# Patient Record
Sex: FEMALE | Race: OTHER | NOT HISPANIC OR LATINO | URBAN - METROPOLITAN AREA
[De-identification: names, ages, dates, MRNs, and addresses within clinical notes are randomized per-mention and may not be internally consistent; named-entity substitution may affect disease eponyms.]

---

## 2017-08-16 ENCOUNTER — OUTPATIENT (OUTPATIENT)
Dept: OUTPATIENT SERVICES | Facility: HOSPITAL | Age: 37
LOS: 1 days | End: 2017-08-16
Payer: MEDICAID

## 2017-08-17 PROCEDURE — 78014 THYROID IMAGING W/BLOOD FLOW: CPT | Mod: 26

## 2017-08-17 PROCEDURE — 78014 THYROID IMAGING W/BLOOD FLOW: CPT

## 2017-08-17 PROCEDURE — A9516: CPT

## 2017-08-29 ENCOUNTER — OUTPATIENT (OUTPATIENT)
Dept: OUTPATIENT SERVICES | Facility: HOSPITAL | Age: 37
LOS: 1 days | End: 2017-08-29
Payer: MEDICAID

## 2017-08-29 PROCEDURE — 84702 CHORIONIC GONADOTROPIN TEST: CPT

## 2017-08-29 PROCEDURE — A9517: CPT

## 2017-08-29 PROCEDURE — 79005 NUCLEAR RX ORAL ADMIN: CPT

## 2017-08-29 PROCEDURE — 79005 NUCLEAR RX ORAL ADMIN: CPT | Mod: 26

## 2022-10-08 ENCOUNTER — EMERGENCY (EMERGENCY)
Facility: HOSPITAL | Age: 42
LOS: 1 days | Discharge: HOME | End: 2022-10-08
Attending: STUDENT IN AN ORGANIZED HEALTH CARE EDUCATION/TRAINING PROGRAM | Admitting: STUDENT IN AN ORGANIZED HEALTH CARE EDUCATION/TRAINING PROGRAM
Payer: COMMERCIAL

## 2022-10-08 VITALS
TEMPERATURE: 98 F | SYSTOLIC BLOOD PRESSURE: 118 MMHG | DIASTOLIC BLOOD PRESSURE: 74 MMHG | OXYGEN SATURATION: 100 % | HEIGHT: 65 IN | RESPIRATION RATE: 18 BRPM | HEART RATE: 75 BPM

## 2022-10-08 VITALS
HEART RATE: 74 BPM | SYSTOLIC BLOOD PRESSURE: 132 MMHG | RESPIRATION RATE: 18 BRPM | DIASTOLIC BLOOD PRESSURE: 76 MMHG | TEMPERATURE: 99 F | OXYGEN SATURATION: 98 %

## 2022-10-08 DIAGNOSIS — O26.851 SPOTTING COMPLICATING PREGNANCY, FIRST TRIMESTER: ICD-10-CM

## 2022-10-08 DIAGNOSIS — Z98.818 OTHER DENTAL PROCEDURE STATUS: ICD-10-CM

## 2022-10-08 DIAGNOSIS — D25.1 INTRAMURAL LEIOMYOMA OF UTERUS: ICD-10-CM

## 2022-10-08 DIAGNOSIS — O99.281 ENDOCRINE, NUTRITIONAL AND METABOLIC DISEASES COMPLICATING PREGNANCY, FIRST TRIMESTER: ICD-10-CM

## 2022-10-08 DIAGNOSIS — Z20.822 CONTACT WITH AND (SUSPECTED) EXPOSURE TO COVID-19: ICD-10-CM

## 2022-10-08 DIAGNOSIS — E03.9 HYPOTHYROIDISM, UNSPECIFIED: ICD-10-CM

## 2022-10-08 DIAGNOSIS — E05.00 THYROTOXICOSIS WITH DIFFUSE GOITER WITHOUT THYROTOXIC CRISIS OR STORM: ICD-10-CM

## 2022-10-08 DIAGNOSIS — O20.9 HEMORRHAGE IN EARLY PREGNANCY, UNSPECIFIED: ICD-10-CM

## 2022-10-08 DIAGNOSIS — Z3A.01 LESS THAN 8 WEEKS GESTATION OF PREGNANCY: ICD-10-CM

## 2022-10-08 DIAGNOSIS — R10.30 LOWER ABDOMINAL PAIN, UNSPECIFIED: ICD-10-CM

## 2022-10-08 DIAGNOSIS — O34.11 MATERNAL CARE FOR BENIGN TUMOR OF CORPUS UTERI, FIRST TRIMESTER: ICD-10-CM

## 2022-10-08 DIAGNOSIS — O99.891 OTHER SPECIFIED DISEASES AND CONDITIONS COMPLICATING PREGNANCY: ICD-10-CM

## 2022-10-08 LAB
ANION GAP SERPL CALC-SCNC: 7 MMOL/L — SIGNIFICANT CHANGE UP (ref 5–17)
ANION GAP SERPL CALC-SCNC: 9 MMOL/L — SIGNIFICANT CHANGE UP (ref 5–17)
APPEARANCE UR: CLEAR — SIGNIFICANT CHANGE UP
BASOPHILS # BLD AUTO: 0.02 K/UL — SIGNIFICANT CHANGE UP (ref 0–0.2)
BASOPHILS NFR BLD AUTO: 0.3 % — SIGNIFICANT CHANGE UP (ref 0–2)
BILIRUB UR-MCNC: NEGATIVE — SIGNIFICANT CHANGE UP
BLD GP AB SCN SERPL QL: NEGATIVE — SIGNIFICANT CHANGE UP
BUN SERPL-MCNC: 9 MG/DL — SIGNIFICANT CHANGE UP (ref 7–23)
BUN SERPL-MCNC: 9 MG/DL — SIGNIFICANT CHANGE UP (ref 7–23)
CALCIUM SERPL-MCNC: 9.3 MG/DL — SIGNIFICANT CHANGE UP (ref 8.4–10.5)
CALCIUM SERPL-MCNC: 9.4 MG/DL — SIGNIFICANT CHANGE UP (ref 8.4–10.5)
CHLORIDE SERPL-SCNC: 104 MMOL/L — SIGNIFICANT CHANGE UP (ref 96–108)
CHLORIDE SERPL-SCNC: 104 MMOL/L — SIGNIFICANT CHANGE UP (ref 96–108)
CO2 SERPL-SCNC: 25 MMOL/L — SIGNIFICANT CHANGE UP (ref 22–31)
CO2 SERPL-SCNC: 26 MMOL/L — SIGNIFICANT CHANGE UP (ref 22–31)
COLOR SPEC: YELLOW — SIGNIFICANT CHANGE UP
CREAT SERPL-MCNC: 0.81 MG/DL — SIGNIFICANT CHANGE UP (ref 0.5–1.3)
CREAT SERPL-MCNC: 0.85 MG/DL — SIGNIFICANT CHANGE UP (ref 0.5–1.3)
DIFF PNL FLD: ABNORMAL
EGFR: 87 ML/MIN/1.73M2 — SIGNIFICANT CHANGE UP
EGFR: 92 ML/MIN/1.73M2 — SIGNIFICANT CHANGE UP
EOSINOPHIL # BLD AUTO: 0.04 K/UL — SIGNIFICANT CHANGE UP (ref 0–0.5)
EOSINOPHIL NFR BLD AUTO: 0.6 % — SIGNIFICANT CHANGE UP (ref 0–6)
GLUCOSE SERPL-MCNC: 85 MG/DL — SIGNIFICANT CHANGE UP (ref 70–99)
GLUCOSE SERPL-MCNC: 91 MG/DL — SIGNIFICANT CHANGE UP (ref 70–99)
GLUCOSE UR QL: NEGATIVE — SIGNIFICANT CHANGE UP
HCG SERPL-ACNC: 4203 MIU/ML — HIGH
HCT VFR BLD CALC: 33.5 % — LOW (ref 34.5–45)
HGB BLD-MCNC: 11 G/DL — LOW (ref 11.5–15.5)
IMM GRANULOCYTES NFR BLD AUTO: 0.2 % — SIGNIFICANT CHANGE UP (ref 0–0.9)
KETONES UR-MCNC: ABNORMAL MG/DL
LEUKOCYTE ESTERASE UR-ACNC: NEGATIVE — SIGNIFICANT CHANGE UP
LYMPHOCYTES # BLD AUTO: 2.04 K/UL — SIGNIFICANT CHANGE UP (ref 1–3.3)
LYMPHOCYTES # BLD AUTO: 32.6 % — SIGNIFICANT CHANGE UP (ref 13–44)
MCHC RBC-ENTMCNC: 27.2 PG — SIGNIFICANT CHANGE UP (ref 27–34)
MCHC RBC-ENTMCNC: 32.8 GM/DL — SIGNIFICANT CHANGE UP (ref 32–36)
MCV RBC AUTO: 82.7 FL — SIGNIFICANT CHANGE UP (ref 80–100)
MONOCYTES # BLD AUTO: 0.37 K/UL — SIGNIFICANT CHANGE UP (ref 0–0.9)
MONOCYTES NFR BLD AUTO: 5.9 % — SIGNIFICANT CHANGE UP (ref 2–14)
NEUTROPHILS # BLD AUTO: 3.78 K/UL — SIGNIFICANT CHANGE UP (ref 1.8–7.4)
NEUTROPHILS NFR BLD AUTO: 60.4 % — SIGNIFICANT CHANGE UP (ref 43–77)
NITRITE UR-MCNC: NEGATIVE — SIGNIFICANT CHANGE UP
NRBC # BLD: 0 /100 WBCS — SIGNIFICANT CHANGE UP (ref 0–0)
PH UR: 8 — SIGNIFICANT CHANGE UP (ref 5–8)
PLATELET # BLD AUTO: 321 K/UL — SIGNIFICANT CHANGE UP (ref 150–400)
POTASSIUM SERPL-MCNC: 3.8 MMOL/L — SIGNIFICANT CHANGE UP (ref 3.5–5.3)
POTASSIUM SERPL-MCNC: SIGNIFICANT CHANGE UP MMOL/L (ref 3.5–5.3)
POTASSIUM SERPL-SCNC: 3.8 MMOL/L — SIGNIFICANT CHANGE UP (ref 3.5–5.3)
POTASSIUM SERPL-SCNC: SIGNIFICANT CHANGE UP MMOL/L (ref 3.5–5.3)
PROT UR-MCNC: NEGATIVE MG/DL — SIGNIFICANT CHANGE UP
RBC # BLD: 4.05 M/UL — SIGNIFICANT CHANGE UP (ref 3.8–5.2)
RBC # FLD: 13.8 % — SIGNIFICANT CHANGE UP (ref 10.3–14.5)
RH IG SCN BLD-IMP: POSITIVE — SIGNIFICANT CHANGE UP
RH IG SCN BLD-IMP: POSITIVE — SIGNIFICANT CHANGE UP
SARS-COV-2 RNA SPEC QL NAA+PROBE: NEGATIVE — SIGNIFICANT CHANGE UP
SODIUM SERPL-SCNC: 137 MMOL/L — SIGNIFICANT CHANGE UP (ref 135–145)
SODIUM SERPL-SCNC: 138 MMOL/L — SIGNIFICANT CHANGE UP (ref 135–145)
SP GR SPEC: 1.01 — SIGNIFICANT CHANGE UP (ref 1–1.03)
UROBILINOGEN FLD QL: 1 E.U./DL — SIGNIFICANT CHANGE UP
WBC # BLD: 6.26 K/UL — SIGNIFICANT CHANGE UP (ref 3.8–10.5)
WBC # FLD AUTO: 6.26 K/UL — SIGNIFICANT CHANGE UP (ref 3.8–10.5)

## 2022-10-08 PROCEDURE — 84702 CHORIONIC GONADOTROPIN TEST: CPT

## 2022-10-08 PROCEDURE — 86850 RBC ANTIBODY SCREEN: CPT

## 2022-10-08 PROCEDURE — 76801 OB US < 14 WKS SINGLE FETUS: CPT | Mod: 26

## 2022-10-08 PROCEDURE — 80048 BASIC METABOLIC PNL TOTAL CA: CPT

## 2022-10-08 PROCEDURE — 99284 EMERGENCY DEPT VISIT MOD MDM: CPT | Mod: 25

## 2022-10-08 PROCEDURE — 86901 BLOOD TYPING SEROLOGIC RH(D): CPT

## 2022-10-08 PROCEDURE — 81001 URINALYSIS AUTO W/SCOPE: CPT

## 2022-10-08 PROCEDURE — 85025 COMPLETE CBC W/AUTO DIFF WBC: CPT

## 2022-10-08 PROCEDURE — 87635 SARS-COV-2 COVID-19 AMP PRB: CPT

## 2022-10-08 PROCEDURE — 99285 EMERGENCY DEPT VISIT HI MDM: CPT

## 2022-10-08 PROCEDURE — 76801 OB US < 14 WKS SINGLE FETUS: CPT

## 2022-10-08 PROCEDURE — 76817 TRANSVAGINAL US OBSTETRIC: CPT

## 2022-10-08 PROCEDURE — 76817 TRANSVAGINAL US OBSTETRIC: CPT | Mod: 26

## 2022-10-08 PROCEDURE — 86900 BLOOD TYPING SEROLOGIC ABO: CPT

## 2022-10-08 PROCEDURE — 36415 COLL VENOUS BLD VENIPUNCTURE: CPT

## 2022-10-08 NOTE — ED ADULT NURSE REASSESSMENT NOTE - NS ED NURSE REASSESS COMMENT FT1
All labs, US resulted, symptoms improved, no new symptom complaint.  Vital signs stable.  Seen by OB GYNE.  Discharged to home in stable condition.

## 2022-10-08 NOTE — ED ADULT NURSE REASSESSMENT NOTE - NS ED NURSE REASSESS COMMENT FT1
Patient /aoX 3, c/o of pelvic pain with moderate vaginal bleed, no back pain, no nausea/vomitting.  Vital signs stable.  PIV #22 in place, all labs sent, no complications. Brought to US in stable condition.

## 2022-10-08 NOTE — ED PROVIDER NOTE - NSFOLLOWUPINSTRUCTIONS_ED_ALL_ED_FT
Please follow up with Dr. Jones as instructed by gyn as soon as possible    Please return to the ed for any worsening pain,  soaking more than one pad an hour shortness of breath or any other alarming symptoms                       Vaginal Bleeding During Pregnancy, First Trimester      A small amount of bleeding from the vagina, or spotting, is common during early pregnancy. Some bleeding may be related to the pregnancy, and some may not. In many cases, the bleeding is normal and is not a problem. However, bleeding can also be a sign of something serious.    Normal things that may cause bleeding during the first trimester:  •Implantation of the fertilized egg in the lining of the uterus.      •Rapid changes in blood vessels. This is caused by changes that are happening to the body during pregnancy.      •Sex.      •Pelvic exams.      Abnormal things that may cause bleeding during the first trimester include:  •Infection or inflammation of the cervix.      •Growths or polyps on the cervix.      •Miscarriage or threatened miscarriage.      •Pregnancy that is growing outside of the uterus (ectopic pregnancy).      •A fertilized egg that becomes a mass of tissue (molar pregnancy).      Tell your health care provider right away if there is any bleeding from your vagina.      Follow these instructions at home:      Monitoring your bleeding      Monitor your bleeding.  •Pay attention to any changes in your symptoms. Let your health care provider know about any concerns.      •Try to understand when the bleeding occurs. Does the bleeding start on its own, or does it start after something is done, such as sex or a pelvic exam?    •Use a diary to record the things you see about your bleeding, including:   •The kind of bleeding you are having. Does the bleeding start and stop irregularly, or is it a constant flow?      •The severity of your bleeding. Is the bleeding heavy or light?      •The number of pads you use each day, how often you change them, and how soaked they are.        •Tell your health care provider if you pass tissue. He or she may want to see it.      Activity     •Follow instructions from your health care provider about limiting your activity. Ask what activities are safe for you.      • Do not have sex until your health care provider says that this is safe.      •If needed, make plans for someone to help with your regular activities.      General instructions     •Take over-the-counter and prescription medicines only as told by your health care provider.      • Do not take aspirin because it can cause bleeding.      • Do not use tampons or douche.      •Keep all follow-up visits. This is important.        Contact a health care provider if:    •You have vaginal bleeding during any part of your pregnancy.      •You have cramps or labor pains.      •You have a fever or chills.        Get help right away if:    •You have severe cramps in your back or abdomen.      •You pass large clots or a large amount of tissue from your vagina.      •Your bleeding increases.      •You feel light-headed or weak, or you faint.      •You are leaking fluid or have a gush of fluid from your vagina.        Summary    •A small amount of bleeding from the vagina is common during early pregnancy.      •Be sure to tell your health care provider about any vaginal bleeding right away.      •Try to understand when bleeding occurs. Does bleeding occur on its own, or does it occur after something is done, such as sex or pelvic exams?      •Keep all follow-up visits. This is important.      This information is not intended to replace advice given to you by your health care provider. Make sure you discuss any questions you have with your health care provider.

## 2022-10-08 NOTE — ED ADULT NURSE NOTE - OBJECTIVE STATEMENT
, 6 weeks pregnant, c/o of bilateral pelvic pain, no abdominal cramping or back pain, no vaginal bleed or discharge, no nausea/vomitting.

## 2022-10-08 NOTE — ED PROVIDER NOTE - OBJECTIVE STATEMENT
41 y/o F  with a PMHx of hypothyroidism who is currently on levothyroxine who presents to the ED c/o vaginal bleeding and cramping. Pts LMP was . Pt reports last night having spotting and cramping pain which was severe. Pt reports using 3 pads with no blood soaked. Pt also mentioned having a hx of fibroids removed in 2019. Pt also notes that she had a dental procedure of an abscess removal due to her waking up with facial droop and was found to be an abscess and was placed on Augmentin. Pts GYN is Dr. Kearns. 43 y/o F  with a PMHx of hypothyroidism who is currently on levothyroxine who presents to the ED c/o vaginal bleeding and cramping. Pts LMP was . Pt reports last night having spotting and cramping pain which was worse than current. Pt reports using 3 pads with no blood soaked today. Pt also mentioned having a hx of fibroids removed in 2019. Pt also notes that she had a dental procedure of an abscess removal due to her waking up with facial droop and was found to be an abscess and was placed on Augmentin. Pts GYN is Dr. Kearns. 43 y/o F  with a PMHx of hypothyroidism who is currently on levothyroxine currently pregnant 6 week gestation lmp  who presents to the ED c/o vaginal bleeding and cramping. Pt reports last night having spotting and cramping pain which was worse than current. Pt reports using 3 pads with no blood soaked today. Pt also mentioned having a hx of fibroids removed in 2019. Pt also notes that she had a dental procedure of an abscess removal due to her waking up with facial droop and was found to be an abscess and was placed on Augmentin. Pts GYN is Dr. Kearns.

## 2022-10-08 NOTE — ED PROVIDER NOTE - CLINICAL SUMMARY MEDICAL DECISION MAKING FREE TEXT BOX
43 y/o F currently pregnant here with vaginal bleeding, did not soak through pads. Will get labs and US. Likely contact GYN. 43 y/o F currently pregnant here with vaginal bleeding, did not soak through pads. Will get labs and US. Likely contact GYN.    us shows iup no fetal hr  pt a pos blood  other labs wnl  pt seen by gyn aparna threatened miscarrige  h and h wnl     The patient understands the Emergency Department diagnosis is a preliminary diagnosis often based on limited information and that the patient must adhere to the follow-up plan as discussed.  At the time of discharge from the Emergency Department, the patient is alert with fluent appropriate speech and ambulatory without difficulty.  A medical screening examination was performed and no emergency medical condition was identified. 41 y/o F currently pregnant here with vaginal bleeding, did not soak through pads. Will get labs and US. Likely contact GYN.    us shows iup no fetal hr  pt a pos blood  other labs wnl  pt seen by gyn aparna threatened miscarrige versus early preg  h and h wnl     The patient understands the Emergency Department diagnosis is a preliminary diagnosis often based on limited information and that the patient must adhere to the follow-up plan as discussed.  At the time of discharge from the Emergency Department, the patient is alert with fluent appropriate speech and ambulatory without difficulty.  A medical screening examination was performed and no emergency medical condition was identified. 43 y/o F currently pregnant here with vaginal bleeding, did not soak through pads. Will get labs and US. Likely contact GYN.    us shows iup no fetal hr  pt a pos blood  other labs wnl  pt seen by gyn likely threatened miscarriage versus early preg  h and h wnl     The patient understands the Emergency Department diagnosis is a preliminary diagnosis often based on limited information and that the patient must adhere to the follow-up plan as discussed.  At the time of discharge from the Emergency Department, the patient is alert with fluent appropriate speech and ambulatory without difficulty.  A medical screening examination was performed and no emergency medical condition was identified.

## 2022-10-08 NOTE — ED PROVIDER NOTE - PATIENT PORTAL LINK FT
You can access the FollowMyHealth Patient Portal offered by [facility] by registering at the following website: [email or web address]. By joining [email or web address]’s FollowMyHealth portal, you will also be able to view your health information using other applications (apps) compatible with our system.

## 2022-10-08 NOTE — CONSULT NOTE ADULT - SUBJECTIVE AND OBJECTIVE BOX
43yo  LMP /24 (6+3wks) presents w/ 1 wk lower abd cramping and 1 day vaginal bleeding. Pt states she was feeling cramping intermittently throughout the week which worsened yesterday. she states that today her cramping became a 9/10 in the lower abd and has since become 4-5/10 intermittently She also endorses vaginal spotting yesterday which has become heavier today. She states she used 3 pads today not fully soaked. She denies placing anything inside her vagina over the past week. Pt denies fever, chills, chest pain, SOB, nausea, vomiting, leakage of fluid, abnormal discharge, and dysuria.       OBhx: G1 VTOP D/C   GYN Hx:  Last PAP smear:     PMHx:   SHx:  Meds:   Allergies:     Social hx:     PHYSICAL EXAM:   Vital Signs Last 24 Hrs  T(C): 36.9 (08 Oct 2022 17:13), Max: 36.9 (08 Oct 2022 17:13)  T(F): 98.5 (08 Oct 2022 17:13), Max: 98.5 (08 Oct 2022 17:13)  HR: 75 (08 Oct 2022 17:13) (75 - 75)  BP: 118/74 (08 Oct 2022 17:13) (118/74 - 118/74)  BP(mean): --  RR: 18 (08 Oct 2022 17:13) (18 - 18)  SpO2: 100% (08 Oct 2022 17:13) (100% - 100%)    Parameters below as of 08 Oct 2022 17:13  Patient On (Oxygen Delivery Method): room air        **************************  Constitutional: Alert & Oriented x3, No acute distress  Respiratory: Clear to ausculation bilaterally; no wheezing, rhonchi, or crackles  Cardiovascular: regular rate and rhythm, no murmurs, or gallops  Gastrointestinal: soft, non tender, positive bowel sounds, no rebound or guarding   Pelvic exam:   Extremities: no calf tenderness or swelling    LABS:                        11.0   6.26  )-----------( 321      ( 08 Oct 2022 18:13 )             33.5     10    138  |  104  |  9   ----------------------------<  91  3.8   |  25  |  0.81    Ca    9.3      08 Oct 2022 19:29        Urinalysis Basic - ( 08 Oct 2022 17:51 )    Color: Yellow / Appearance: Clear / S.015 / pH: x  Gluc: x / Ketone: Trace mg/dL  / Bili: Negative / Urobili: 1.0 E.U./dL   Blood: x / Protein: NEGATIVE mg/dL / Nitrite: NEGATIVE   Leuk Esterase: NEGATIVE / RBC: < 5 /HPF / WBC < 5 /HPF   Sq Epi: x / Non Sq Epi: 0-5 /HPF / Bacteria: Present /HPF      HCG Quantitative, Serum: 4203 mIU/mL (10-08 @ 18:13)      RADIOLOGY & ADDITIONAL STUDIES: 41yo  LMP  (6+3wks) presents w/ 1 wk lower abd cramping and 1 day vaginal bleeding. Pt states she was feeling cramping intermittently throughout the week which worsened yesterday. she states that today her cramping became a 9/10 in the lower abd and has since become 4-5/10 intermittently She also endorses vaginal spotting yesterday which has become heavier today. She states she used 3 pads today not fully soaked. She denies placing anything inside her vagina over the past week. Pt has not seen her OBGYN yet. She has an appt this coming Wednesday 10/12. Pt denies fever, chills, chest pain, SOB, nausea, vomiting, leakage of fluid, abnormal discharge, and dysuria.       OBhx: G1 VTOP D/C , G2 c/s, G3 current  GYN Hx:+ fibroids  PMHx: graves disease  SHx: r/a myomectomy   Meds: synthroid 137mcg qd  Allergies: NKDA      PHYSICAL EXAM:   Vital Signs Last 24 Hrs  T(C): 36.9 (08 Oct 2022 17:13), Max: 36.9 (08 Oct 2022 17:13)  T(F): 98.5 (08 Oct 2022 17:13), Max: 98.5 (08 Oct 2022 17:13)  HR: 75 (08 Oct 2022 17:13) (75 - 75)  BP: 118/74 (08 Oct 2022 17:13) (118/74 - 118/74)  BP(mean): --  RR: 18 (08 Oct 2022 17:13) (18 - 18)  SpO2: 100% (08 Oct 2022 17:13) (100% - 100%)    Parameters below as of 08 Oct 2022 17:13  Patient On (Oxygen Delivery Method): room air        **************************  Constitutional: Alert & Oriented x3, No acute distress  Respiratory: Clear to ausculation bilaterally; no wheezing, rhonchi, or crackles  Cardiovascular: regular rate and rhythm, no murmurs, or gallops  Gastrointestinal: soft, non tender, positive bowel sounds, no rebound or guarding   Pelvic exam: closed, no CMT, fullness appreciated in the anterior fornix, no adnexal tenderness  SSPEC: old 3-5cc clot seen, no active bleeding, normal mucosa, cervix closed, no lesions  Extremities: no calf tenderness or swelling    LABS:                        11.0   6.26  )-----------( 321      ( 08 Oct 2022 18:13 )             33.5     10-08    138  |  104  |  9   ----------------------------<  91  3.8   |  25  |  0.81    Ca    9.3      08 Oct 2022 19:29        Urinalysis Basic - ( 08 Oct 2022 17:51 )    Color: Yellow / Appearance: Clear / S.015 / pH: x  Gluc: x / Ketone: Trace mg/dL  / Bili: Negative / Urobili: 1.0 E.U./dL   Blood: x / Protein: NEGATIVE mg/dL / Nitrite: NEGATIVE   Leuk Esterase: NEGATIVE / RBC: < 5 /HPF / WBC < 5 /HPF   Sq Epi: x / Non Sq Epi: 0-5 /HPF / Bacteria: Present /HPF      HCG Quantitative, Serum: 4203 mIU/mL (10-08 @ 18:13)      RADIOLOGY & ADDITIONAL STUDIES: < from: US OB <=14 Weeks, First Gestation (10.08.22 @ 20:57) >    ACC: [id number] EXAM:  US OB LES THAN 14 WKS 1ST GEST                        ACC: [id number] EXAM:  US OB TRANSVAGINAL                          PROCEDURE DATE:  10/08/2022          INTERPRETATION:  CLINICAL INFORMATION: Vaginal bleeding    LMP: 2022    Estimated Gestational Age by LMP: 6 weeks 3 days    COMPARISON: None available.    Endovaginal and transabdominal pelvic sonogram. Color and Spectral   Doppler was performed.    FINDINGS:  Uterus: 11.1 x 5.8 x 7.4 cm. There is a 5.3 x 5.1 x 4.8cm intramural   leiomyoma posterior uterine body. 2.5 x 2.4 x 2.6 cm intramural leiomyoma   uterine body.    Gestational Sac Size (mean): 1.1 cm  Gestations Sac Shape : Normal.  Crown Rump Length: 0.7 cm  Estimated Gestational Age: 5 weeks 6 days by mean gestational sac size.  Yolk Sac: Not clearly seen.  Fetal Heart Rate: Not obtainable.    Right ovary: 21.4 mm x 10.6 mm x 11.3 mm. Within normal limits. Normal   arterial and venous waveforms.  Left ovary: 35.9 mm x 21.9 mm x 23.4 mm. There is a 1.8 x 1.1 x 1.5 cm   corpus luteum. Normal arterial and venous waveforms.    Fluid: None.      IMPRESSION:  Single intrauterine gestation of uncertain viability. No acute   abnormality. Recommend continued follow-up.    Leiomyomatous uterus.    --- End of Report ---          DARLING SCHMID MD; Resident Radiologist  This document has been electronically signed.  RENU ANDERSON MD; Attending Radiologist  This document has been electronically signed. Oct  8 2022 10:18PM    < end of copied text >

## 2022-10-08 NOTE — ED ADULT NURSE NOTE - NSIMPLEMENTINTERV_GEN_ALL_ED
Implemented All Universal Safety Interventions:  [place] to call system. Call bell, personal items and telephone within reach. Instruct patient to call for assistance. Room bathroom lighting operational. Non-slip footwear when patient is off stretcher. Physically safe environment: no spills, clutter or unnecessary equipment. Stretcher in lowest position, wheels locked, appropriate side rails in place.

## 2022-10-08 NOTE — ED ADULT TRIAGE NOTE - CHIEF COMPLAINT QUOTE
6 wks gestation still pending first obgyn appt presents reporting bilateral pelvic pain, moderate vaginal bleeding described as more than spotting but not soaking pads.

## 2022-10-08 NOTE — CONSULT NOTE ADULT - ASSESSMENT
43yo  LMP  (6+3 wks) presents with vaginal bleeding and cramping after having a positive pregnancy test    -Pt hemodynamically stable  -Pt blood type A+ no need for rhogam  - Pt US shows CRL 0.7mm and mean gestational sac 1.1cm without FH. Mean gestational sac 2.5cm w/o FH diagnostic for failed pregnancy  -Pt with likely diagnosis of Threatened AB  -Pt to f/u w/ her obgyn as scheduled on 10/12  -Pt given strict return precautions including severe abd pain unresolved with medications, heavy vaginal bleeding soaking 2 pads per hour for 2 hrs, or symptoms of SOB or LOC  d/w Dr. Box

## 2022-10-08 NOTE — ED PROVIDER NOTE - NS ED ATTENDING STATEMENT MOD
This was a shared visit with the MONSTER. I reviewed and verified the documentation and independently performed the documented:

## 2022-10-14 ENCOUNTER — INPATIENT (INPATIENT)
Facility: HOSPITAL | Age: 42
LOS: 0 days | Discharge: HOME | DRG: 770 | End: 2022-10-14
Attending: PEDIATRICS | Admitting: PEDIATRICS
Payer: COMMERCIAL

## 2022-10-14 VITALS
OXYGEN SATURATION: 99 % | SYSTOLIC BLOOD PRESSURE: 142 MMHG | DIASTOLIC BLOOD PRESSURE: 83 MMHG | RESPIRATION RATE: 15 BRPM | HEART RATE: 73 BPM | TEMPERATURE: 97 F

## 2022-10-14 VITALS
OXYGEN SATURATION: 100 % | WEIGHT: 175.93 LBS | RESPIRATION RATE: 18 BRPM | HEIGHT: 65 IN | DIASTOLIC BLOOD PRESSURE: 76 MMHG | TEMPERATURE: 99 F | SYSTOLIC BLOOD PRESSURE: 138 MMHG | HEART RATE: 65 BPM

## 2022-10-14 PROBLEM — E03.9 HYPOTHYROIDISM, UNSPECIFIED: Chronic | Status: ACTIVE | Noted: 2022-10-08

## 2022-10-14 LAB
ALBUMIN SERPL ELPH-MCNC: 4.2 G/DL — SIGNIFICANT CHANGE UP (ref 3.3–5)
ALP SERPL-CCNC: 60 U/L — SIGNIFICANT CHANGE UP (ref 40–120)
ALT FLD-CCNC: 47 U/L — HIGH (ref 10–45)
ANION GAP SERPL CALC-SCNC: 8 MMOL/L — SIGNIFICANT CHANGE UP (ref 5–17)
APTT BLD: 30.5 SEC — SIGNIFICANT CHANGE UP (ref 27.5–35.5)
AST SERPL-CCNC: 28 U/L — SIGNIFICANT CHANGE UP (ref 10–40)
BASOPHILS # BLD AUTO: 0.02 K/UL — SIGNIFICANT CHANGE UP (ref 0–0.2)
BASOPHILS NFR BLD AUTO: 0.4 % — SIGNIFICANT CHANGE UP (ref 0–2)
BILIRUB SERPL-MCNC: <0.2 MG/DL — SIGNIFICANT CHANGE UP (ref 0.2–1.2)
BLD GP AB SCN SERPL QL: NEGATIVE — SIGNIFICANT CHANGE UP
BUN SERPL-MCNC: 10 MG/DL — SIGNIFICANT CHANGE UP (ref 7–23)
CALCIUM SERPL-MCNC: 9.2 MG/DL — SIGNIFICANT CHANGE UP (ref 8.4–10.5)
CHLORIDE SERPL-SCNC: 106 MMOL/L — SIGNIFICANT CHANGE UP (ref 96–108)
CO2 SERPL-SCNC: 26 MMOL/L — SIGNIFICANT CHANGE UP (ref 22–31)
CREAT SERPL-MCNC: 0.82 MG/DL — SIGNIFICANT CHANGE UP (ref 0.5–1.3)
EGFR: 90 ML/MIN/1.73M2 — SIGNIFICANT CHANGE UP
EOSINOPHIL # BLD AUTO: 0.03 K/UL — SIGNIFICANT CHANGE UP (ref 0–0.5)
EOSINOPHIL NFR BLD AUTO: 0.6 % — SIGNIFICANT CHANGE UP (ref 0–6)
GLUCOSE BLDC GLUCOMTR-MCNC: 79 MG/DL — SIGNIFICANT CHANGE UP (ref 70–99)
GLUCOSE SERPL-MCNC: 87 MG/DL — SIGNIFICANT CHANGE UP (ref 70–99)
HCG SERPL-ACNC: 1344 MIU/ML — HIGH
HCT VFR BLD CALC: 35.8 % — SIGNIFICANT CHANGE UP (ref 34.5–45)
HGB BLD-MCNC: 11.7 G/DL — SIGNIFICANT CHANGE UP (ref 11.5–15.5)
IMM GRANULOCYTES NFR BLD AUTO: 0.2 % — SIGNIFICANT CHANGE UP (ref 0–0.9)
INR BLD: 0.99 — SIGNIFICANT CHANGE UP (ref 0.88–1.16)
LYMPHOCYTES # BLD AUTO: 1.92 K/UL — SIGNIFICANT CHANGE UP (ref 1–3.3)
LYMPHOCYTES # BLD AUTO: 36.8 % — SIGNIFICANT CHANGE UP (ref 13–44)
MCHC RBC-ENTMCNC: 26.9 PG — LOW (ref 27–34)
MCHC RBC-ENTMCNC: 32.7 GM/DL — SIGNIFICANT CHANGE UP (ref 32–36)
MCV RBC AUTO: 82.3 FL — SIGNIFICANT CHANGE UP (ref 80–100)
MONOCYTES # BLD AUTO: 0.33 K/UL — SIGNIFICANT CHANGE UP (ref 0–0.9)
MONOCYTES NFR BLD AUTO: 6.3 % — SIGNIFICANT CHANGE UP (ref 2–14)
NEUTROPHILS # BLD AUTO: 2.91 K/UL — SIGNIFICANT CHANGE UP (ref 1.8–7.4)
NEUTROPHILS NFR BLD AUTO: 55.7 % — SIGNIFICANT CHANGE UP (ref 43–77)
NRBC # BLD: 0 /100 WBCS — SIGNIFICANT CHANGE UP (ref 0–0)
PLATELET # BLD AUTO: 331 K/UL — SIGNIFICANT CHANGE UP (ref 150–400)
POTASSIUM SERPL-MCNC: 3.6 MMOL/L — SIGNIFICANT CHANGE UP (ref 3.5–5.3)
POTASSIUM SERPL-SCNC: 3.6 MMOL/L — SIGNIFICANT CHANGE UP (ref 3.5–5.3)
PROT SERPL-MCNC: 7.7 G/DL — SIGNIFICANT CHANGE UP (ref 6–8.3)
PROTHROM AB SERPL-ACNC: 11.8 SEC — SIGNIFICANT CHANGE UP (ref 10.5–13.4)
RBC # BLD: 4.35 M/UL — SIGNIFICANT CHANGE UP (ref 3.8–5.2)
RBC # FLD: 13.5 % — SIGNIFICANT CHANGE UP (ref 10.3–14.5)
RH IG SCN BLD-IMP: POSITIVE — SIGNIFICANT CHANGE UP
SARS-COV-2 RNA SPEC QL NAA+PROBE: NEGATIVE — SIGNIFICANT CHANGE UP
SODIUM SERPL-SCNC: 140 MMOL/L — SIGNIFICANT CHANGE UP (ref 135–145)
WBC # BLD: 5.22 K/UL — SIGNIFICANT CHANGE UP (ref 3.8–10.5)
WBC # FLD AUTO: 5.22 K/UL — SIGNIFICANT CHANGE UP (ref 3.8–10.5)

## 2022-10-14 PROCEDURE — 85730 THROMBOPLASTIN TIME PARTIAL: CPT

## 2022-10-14 PROCEDURE — 87635 SARS-COV-2 COVID-19 AMP PRB: CPT

## 2022-10-14 PROCEDURE — 86900 BLOOD TYPING SEROLOGIC ABO: CPT

## 2022-10-14 PROCEDURE — 99285 EMERGENCY DEPT VISIT HI MDM: CPT

## 2022-10-14 PROCEDURE — 86850 RBC ANTIBODY SCREEN: CPT

## 2022-10-14 PROCEDURE — 84702 CHORIONIC GONADOTROPIN TEST: CPT

## 2022-10-14 PROCEDURE — 82962 GLUCOSE BLOOD TEST: CPT

## 2022-10-14 PROCEDURE — 86901 BLOOD TYPING SEROLOGIC RH(D): CPT

## 2022-10-14 PROCEDURE — 80053 COMPREHEN METABOLIC PANEL: CPT

## 2022-10-14 PROCEDURE — 85025 COMPLETE CBC W/AUTO DIFF WBC: CPT

## 2022-10-14 PROCEDURE — 85610 PROTHROMBIN TIME: CPT

## 2022-10-14 PROCEDURE — 88305 TISSUE EXAM BY PATHOLOGIST: CPT | Mod: 26

## 2022-10-14 PROCEDURE — 88305 TISSUE EXAM BY PATHOLOGIST: CPT

## 2022-10-14 PROCEDURE — 36415 COLL VENOUS BLD VENIPUNCTURE: CPT

## 2022-10-14 RX ORDER — HYDROMORPHONE HYDROCHLORIDE 2 MG/ML
0.5 INJECTION INTRAMUSCULAR; INTRAVENOUS; SUBCUTANEOUS
Refills: 0 | Status: DISCONTINUED | OUTPATIENT
Start: 2022-10-14 | End: 2022-10-14

## 2022-10-14 RX ORDER — SODIUM CHLORIDE 9 MG/ML
1000 INJECTION, SOLUTION INTRAVENOUS
Refills: 0 | Status: DISCONTINUED | OUTPATIENT
Start: 2022-10-14 | End: 2022-10-14

## 2022-10-14 RX ORDER — ACETAMINOPHEN 500 MG
1000 TABLET ORAL EVERY 6 HOURS
Refills: 0 | Status: DISCONTINUED | OUTPATIENT
Start: 2022-10-14 | End: 2022-10-14

## 2022-10-14 RX ORDER — OXYCODONE HYDROCHLORIDE 5 MG/1
5 TABLET ORAL EVERY 4 HOURS
Refills: 0 | Status: DISCONTINUED | OUTPATIENT
Start: 2022-10-14 | End: 2022-10-14

## 2022-10-14 RX ORDER — SIMETHICONE 80 MG/1
80 TABLET, CHEWABLE ORAL EVERY 6 HOURS
Refills: 0 | Status: DISCONTINUED | OUTPATIENT
Start: 2022-10-14 | End: 2022-10-14

## 2022-10-14 RX ORDER — METOCLOPRAMIDE HCL 10 MG
10 TABLET ORAL EVERY 8 HOURS
Refills: 0 | Status: DISCONTINUED | OUTPATIENT
Start: 2022-10-14 | End: 2022-10-14

## 2022-10-14 RX ORDER — KETOROLAC TROMETHAMINE 30 MG/ML
30 SYRINGE (ML) INJECTION EVERY 8 HOURS
Refills: 0 | Status: DISCONTINUED | OUTPATIENT
Start: 2022-10-14 | End: 2022-10-14

## 2022-10-14 RX ORDER — ONDANSETRON 8 MG/1
8 TABLET, FILM COATED ORAL EVERY 8 HOURS
Refills: 0 | Status: DISCONTINUED | OUTPATIENT
Start: 2022-10-14 | End: 2022-10-14

## 2022-10-14 RX ADMIN — ONDANSETRON 8 MILLIGRAM(S): 8 TABLET, FILM COATED ORAL at 17:26

## 2022-10-14 RX ADMIN — Medication 120 MILLIGRAM(S): at 17:31

## 2022-10-14 RX ADMIN — SODIUM CHLORIDE 100 MILLILITER(S): 9 INJECTION, SOLUTION INTRAVENOUS at 15:00

## 2022-10-14 NOTE — H&P ADULT - HISTORY OF PRESENT ILLNESS
· Subjective and Objective:   41yo  LMP  at 7wk2d presenting with vaginal bleeding and cramping s/p misoprostol yesterday for a missed . Pt was initially seen in the ED on 10/8 with vaginal bleeding and cramping. US at that time showed a CRL 0.7mm and mean gestational sac 1.1cm without FH. Mean gestational sac 2.5cm w/o FH diagnostic for failed pregnancy. Pt was seen at [facility] on 10/12 and was given 2 doses of vaginal misoprostol to take at home. Pt took miso yesterday and since then she has been having severe abdominal cramping. Pt states bleeding initially was minimal and then began to pass large clots. She reports taking aleve around the clock with relief, but pain returns once it wears off, She denies fever, chills, chest pain, SOB, n/v, lightheadedness, dizziness, vaginal discharge, dysuria, and hematuria.      OBhx: G1 VTOP D/C , G2 stat c section at 36wk for previa with bleeding in , G3 current  GYN regular menses every 28 days, sexually active with one male partner, hx of uterine fibroids s/p l/s myomectomy in 2019, currently with fibroids of unknown size and location, hx of abnormal pap previously, most recent wnl, denies hx of STIs  PMHx: graves disease s/p radioactive iodine  SHx: l/s myomectomy 2019  Meds: synthroid 137mcg qd  Allergies: NKDA      PHYSICAL EXAM:   Vital Signs Last 24 Hrs  T(C): 37.3 (14 Oct 2022 11:41), Max: 37.3 (14 Oct 2022 11:41)  T(F): 99.1 (14 Oct 2022 11:41), Max: 99.1 (14 Oct 2022 11:41)  HR: 65 (14 Oct 2022 11:41) (65 - 65)  BP: 138/76 (14 Oct 2022 11:41) (138/76 - 138/76)  BP(mean): --  RR: 18 (14 Oct 2022 11:41) (18 - 18)  SpO2: 100% (14 Oct 2022 11:41) (100% - 100%)    Parameters below as of 14 Oct 2022 11:41  Patient On (Oxygen Delivery Method): room air        **************************  Constitutional: Alert & Oriented x3, No acute distress  Abdomen: soft, NT, ND, no guarding, no rebound tenderness  SSE: normal appearing external genitalia, approximately 15cc of dark red blood in the vaginal vault, os appears FT dilated, no active bleeding  LABS:                        11.7   5.22  )-----------( 331      ( 14 Oct 2022 12:12 )             35.8     10-14    140  |  106  |  x   ----------------------------<  x   3.6   |  x   |  x         PT/INR - ( 14 Oct 2022 12:12 )   PT: 11.8 sec;   INR: 0.99          PTT - ( 14 Oct 2022 12:12 )  PTT:30.5 sec

## 2022-10-14 NOTE — ED PROVIDER NOTE - OBJECTIVE STATEMENT
41 y/o female w/ hx hypothyroidism,  approx 7w2d (LMP 22) s/p misoprostol x 2 yesterday for miscarriage presents to ED sent in by her OB/GYN for likely D&C.  C/o lower abd cramping and spotting x approx 1wk.  Spotting improved, but noticed 1 blood clot this am.  States cramping worsened after misoprostol.  +nausea.  +constipation.  Took aleve with some improvement.    Denies f/c, cough, cp, sob, vomiting, diarrhea, dysuria.

## 2022-10-14 NOTE — ED ADULT NURSE NOTE - CHIEF COMPLAINT QUOTE
pt sent to ED by MD Wynne for D+C, pt was having a miscarriage, given misoprostol yesterday. c/o abdominal pain and bloating LMP ,

## 2022-10-14 NOTE — CONSULT NOTE ADULT - SUBJECTIVE AND OBJECTIVE BOX
43yo  LMP  at 7wk2d presenting with vaginal bleeding and cramping s/p misoprostol yesterday for a missed . Pt was initially seen in the ED on 10/8 with vaginal bleeding and cramping. US at that time showed a CRL 0.7mm and mean gestational sac 1.1cm without FH. Mean gestational sac 2.5cm w/o FH diagnostic for failed pregnancy. Pt was seen at [facility] on 10/12 and was given 2 doses of vaginal misoprostol to take at home. Pt took miso yesterday and since then she has been having severe abdominal cramping. Pt states bleeding initially was minimal and then began to pass large clots. She reports taking aleve around the clock with relief, but pain returns once it wears off, She denies fever, chills, chest pain, SOB, n/v, lightheadedness, dizziness, vaginal discharge, dysuria, and hematuria.      OBhx: G1 VTOP D/C , G2 stat c section at 36wk for previa with bleeding in , G3 current  GYN regular menses every 28 days, sexually active with one male partner, hx of uterine fibroids s/p l/s myomectomy in 2019, currently with fibroids of unknown size and location, hx of abnormal pap previously, most recent wnl, denies hx of STIs  PMHx: graves disease s/p radioactive iodine  SHx: l/s myomectomy 2019  Meds: synthroid 137mcg qd  Allergies: NKDA      PHYSICAL EXAM:   Vital Signs Last 24 Hrs  T(C): 37.3 (14 Oct 2022 11:41), Max: 37.3 (14 Oct 2022 11:41)  T(F): 99.1 (14 Oct 2022 11:41), Max: 99.1 (14 Oct 2022 11:41)  HR: 65 (14 Oct 2022 11:41) (65 - 65)  BP: 138/76 (14 Oct 2022 11:41) (138/76 - 138/76)  BP(mean): --  RR: 18 (14 Oct 2022 11:41) (18 - 18)  SpO2: 100% (14 Oct 2022 11:41) (100% - 100%)    Parameters below as of 14 Oct 2022 11:41  Patient On (Oxygen Delivery Method): room air        **************************  Constitutional: Alert & Oriented x3, No acute distress  Abdomen: soft, NT, ND, no guarding, no rebound tenderness  LABS:                        11.7   5.22  )-----------( 331      ( 14 Oct 2022 12:12 )             35.8     10-14    140  |  106  |  x   ----------------------------<  x   3.6   |  x   |  x         PT/INR - ( 14 Oct 2022 12:12 )   PT: 11.8 sec;   INR: 0.99          PTT - ( 14 Oct 2022 12:12 )  PTT:30.5 sec        RADIOLOGY & ADDITIONAL STUDIES:

## 2022-10-14 NOTE — ED PROVIDER NOTE - PHYSICAL EXAMINATION
VITAL SIGNS: I have reviewed nursing notes and confirm.  CONSTITUTIONAL: Well appearing, in no acute distress.   SKIN:  warm and dry, no acute rash.   HEAD:  normocephalic, atraumatic.  EYES: EOM intact; conjunctiva and sclera clear.  ENT: No nasal discharge; airway clear.   NECK: Supple; non tender.  CARD: S1, S2 normal; no murmurs, gallops, or rubs. Regular rate and rhythm.   RESP:  Clear to auscultation b/l, no wheezes, rales or rhonchi.  ABD: Normal bowel sounds; soft; non-distended; mild suprpubic ttp   EXT: Normal ROM. No clubbing, cyanosis or edema. 2+ pulses to b/l ue/le.  NEURO: Alert, oriented, grossly unremarkable  PSYCH: Cooperative, mood and affect appropriate.

## 2022-10-14 NOTE — ED ADULT NURSE NOTE - OBJECTIVE STATEMENT
.  42years female alert mental state (AOX3) received on foot.  -Allergy: N/A.  -complain of lower abdomen pain.  Hx of . pt sent to ED by MD Wynne for D&C. pt states that she got vaginal bleeding 6peds per day for 3days. pt presents abdomen pain, nausea.  -denied fever, myalgia, chills, SOB, chest pain, dizziness, headache.  Pt is in the bed comfortably at this time. Will continue to monitor and document any changes.

## 2022-10-14 NOTE — BRIEF OPERATIVE NOTE - OPERATION/FINDINGS
dorsal lithotomy position, 10-11wk size fibroid uterus, serial dilation, 10mm suction currete, POC seen, sharp curretage, second pass with suction, excellent hemostasis

## 2022-10-14 NOTE — PRE-ANESTHESIA EVALUATION ADULT - NSPREOPDXFT_GEN_ALL_CORE
missed AB
Check here if all serologies below were negative, non-reactive or immune. Otherwise select appropriate status.

## 2022-10-14 NOTE — H&P ADULT - NSHPPHYSICALEXAM_GEN_ALL_CORE
PHYSICAL EXAM:   Vital Signs Last 24 Hrs  T(C): 37.3 (14 Oct 2022 11:41), Max: 37.3 (14 Oct 2022 11:41)  T(F): 99.1 (14 Oct 2022 11:41), Max: 99.1 (14 Oct 2022 11:41)  HR: 65 (14 Oct 2022 11:41) (65 - 65)  BP: 138/76 (14 Oct 2022 11:41) (138/76 - 138/76)  BP(mean): --  RR: 18 (14 Oct 2022 11:41) (18 - 18)  SpO2: 100% (14 Oct 2022 11:41) (100% - 100%)    Parameters below as of 14 Oct 2022 11:41  Patient On (Oxygen Delivery Method): room air        **************************  Constitutional: Alert & Oriented x3, No acute distress  Abdomen: soft, NT, ND, no guarding, no rebound tenderness  SSE: normal appearing external genitalia, approximately 15cc of dark red blood in the vaginal vault, os appears FT dilated, no active bleeding

## 2022-10-14 NOTE — ED PROVIDER NOTE - ATTENDING APP SHARED VISIT CONTRIBUTION OF CARE
I agree with above note     Pt sent in by Dr. Higgins for failed medical management of SAB. Pt with mild abd cramping and vag spotting. Took misopristol last night and cramping worsened. VS normal, well appaering, abd soft with mild suprapubic ttp. Evaluated by GYN and recommend admission for D and C. Pre-op labs sent. Will admit.

## 2022-10-14 NOTE — BRIEF OPERATIVE NOTE - NSICDXBRIEFPROCEDURE_GEN_ALL_CORE_FT
PROCEDURES:  Dilation and curettage, uterus, using suction, for missed first trimester  14-Oct-2022 16:56:22  Juan Alanis

## 2022-10-14 NOTE — ASU DISCHARGE PLAN (ADULT/PEDIATRIC) - CARE PROVIDER_API CALL
Pako Wynne  OBSTETRICS AND GYNECOLOGY  [address] [place] [id number]  Phone: [phone]  Fax: [phone]  Follow Up Time:

## 2022-10-14 NOTE — CONSULT NOTE ADULT - ASSESSMENT
41yo  LMP  at 7wk2d presenting with vaginal bleeding and cramping s/p misoprostol yesterday for a missed , being added on for a suction D&C for incomplete AB. Hgb 11.7, T&S A+ from 10/8, covid negative today. Vital signs stable. Pt is hemodynamically stable. Pt has been NPO since 0800.  [] OR booking sheet submitted for suction D&C  [] Consent signed with pt and witnessed  [] T&S from today pending  [] NPO/IVF  [] Page gyn with further questions or concerns    D/w Dr. Wynne

## 2022-10-14 NOTE — PRE-OP CHECKLIST - NS PREOP CHK MONITOR ANESTHESIA CONSENT
You have chosen to receive care through a telephone visit. Do you consent to use a telephone visit for your medical care today? Yes   done

## 2022-10-14 NOTE — ED PROVIDER NOTE - CLINICAL SUMMARY MEDICAL DECISION MAKING FREE TEXT BOX
41 y/o female w/ hx hyperthyroidism,  approx 7w2d (LMP 22) s/p misoprostol x 2 yesterday for miscarriage presents to ED sent in by her OB/GYN for likely D&C.  C/o lower abd cramping and spotting x approx 1wk.  Spotting improved, but noticed 1 blood clot this am.  States cramping worsened after misoprostol.  +nausea.  +constipation.  Took aleve with some improvement.    Denies f/c, cough, cp, sob, vomiting, diarrhea, dysuria.  +ttp to lower abd b/l  VSS  Plan for pre-op labs, pain meds prn, consult ob/gyn prior to imaging  Re-eval  --  D/w OB/GYN who will admit for D&C

## 2022-10-14 NOTE — H&P ADULT - ASSESSMENT
Assessment and Recommendation: 	  43yo  LMP  at 7wk2d presenting with vaginal bleeding and cramping s/p misoprostol yesterday for a missed , being added on for a suction D&C for incomplete AB. Hgb 11.7, T&S A+ from 10/8, covid negative today. Vital signs stable. Pt is hemodynamically stable. Pt has been NPO since 0800.  [] OR booking sheet submitted for suction D&C  [] Consent signed with pt and witnessed  [] T&S from today pending  [] NPO/IVF  [] Page gyn with further questions or concerns    D/w Dr. Wynne

## 2022-10-14 NOTE — ED PROVIDER NOTE - NS ED ROS FT
CONSTITUTIONAL: Awake, alert.  Nontoxic, no acute distress.    HEAD: Normocephalic, atraumatic.    EYES: Conjunctivae clear without exudates or hemorrhage. Sclera is non-icteric.    ENT: Normal appearing external ears, nose, mucous membranes moist.    NECK: supple, trachea midline.    HEART:  Normal rate, regular rhythm.  Heart sounds S1, S2.  No murmurs, rubs or gallops.    LUNGS:  No acute respiratory distress.  Non-tachypneic and non-labored.  Lungs are clear bilaterally with good aeration.  No wheezing, rales, rhonchi.    ABDOMEN: Normal appearing skin without lesions, rashes.  Normal bowel sounds x 4.  Soft, non-distended,+ lower abd ttp b/l. No rebound or guarding. No hernias or masses palpable.  No pulsatile abdominal mass.   No CVA tenderness b/l.    MUSCULOSKELETAL:  Moving all extremities without issue.    SKIN: Skin in warm, dry and intact without rashes or lesions.  Appropriate color for ethnicity.    NEUROLOGICAL:  Patient is alert, oriented x person, place and time.    PSYCH: Appropriate mood and affect. Good judgment and insight.

## 2022-10-14 NOTE — ASU DISCHARGE PLAN (ADULT/PEDIATRIC) - NS MD DC FALL RISK RISK
For information on Fall & Injury Prevention, visit: [email or web address] OR  [email or web address] OR  https://www.cdc.gov/steadi/patient.html

## 2022-10-14 NOTE — ED ADULT NURSE NOTE - ALCOHOL PRE SCREEN (AUDIT - C)
Patient Education        Cough in Children: Care Instructions  Your Care Instructions  A cough is how your child's body responds to something that bothers his or her throat or airways. Many things can cause a cough. Your child might cough because of a cold or the flu, bronchitis, or asthma. Cigarette smoke, postnasal drip, allergies, and stomach acid that backs up into the throat also can cause coughs. A cough is a symptom, not a disease. Most coughs stop when the cause, such as a cold, goes away. You can take a few steps at home to help your child cough less and feel better. Follow-up care is a key part of your child's treatment and safety. Be sure to make and go to all appointments, and call your doctor if your child is having problems. It's also a good idea to know your child's test results and keep a list of the medicines your child takes. How can you care for your child at home? · Have your child drink plenty of water and other fluids. This may help soothe a dry or sore throat. Honey or lemon juice in hot water or tea may ease a dry cough. Do not give honey to a child younger than 3year old. It may contain bacteria that are harmful to infants. · Be careful with cough and cold medicines. Don't give them to children younger than 6, because they don't work for children that age and can even be harmful. For children 6 and older, always follow all the instructions carefully. Make sure you know how much medicine to give and how long to use it. And use the dosing device if one is included. · Keep your child away from smoke. Do not smoke or let anyone else smoke around your child or in your house. · Help your child avoid exposure to smoke, dust, or other pollutants, or have your child wear a face mask. Check with your doctor or pharmacist to find out which type of face mask will give your child the most benefit. When should you call for help? Call 911 anytime you think your child may need emergency care.
Statement Selected

## 2022-10-18 LAB — SURGICAL PATHOLOGY STUDY: SIGNIFICANT CHANGE UP

## 2022-10-19 DIAGNOSIS — O03.4 INCOMPLETE SPONTANEOUS ABORTION WITHOUT COMPLICATION: ICD-10-CM

## 2025-02-23 NOTE — ED ADULT TRIAGE NOTE - CHIEF COMPLAINT QUOTE
pt sent to ED by MD Wynne for D+C, pt was having a miscarriage, given misoprostol yesterday. c/o abdominal pain and bloating LMP ,  nonimmune

## (undated) DEVICE — PRESSURE INFUSOR BAG 3000ML

## (undated) DEVICE — TUBING AQUILEX OUTFLOW

## (undated) DEVICE — POSITIONER FOAM EGG CRATE ULNAR 2PCS (PINK)

## (undated) DEVICE — WARMING BLANKET UPPER ADULT

## (undated) DEVICE — PACK D&C

## (undated) DEVICE — GLV 8 PROTEXIS (WHITE)

## (undated) DEVICE — TUBING TUR 2 PRONG

## (undated) DEVICE — VENODYNE/SCD SLEEVE CALF MEDIUM

## (undated) DEVICE — DRAPE TOWEL BLUE 17" X 24"

## (undated) DEVICE — SOL IRR BAG NS 0.9% 3000ML

## (undated) DEVICE — TUBING STRYKER ARTHROSCOPY INFLOW